# Patient Record
Sex: MALE | Race: WHITE | NOT HISPANIC OR LATINO | Employment: OTHER | URBAN - METROPOLITAN AREA
[De-identification: names, ages, dates, MRNs, and addresses within clinical notes are randomized per-mention and may not be internally consistent; named-entity substitution may affect disease eponyms.]

---

## 2018-03-19 ENCOUNTER — HOSPITAL ENCOUNTER (OUTPATIENT)
Dept: RHEUMATOLOGY | Facility: CLINIC | Age: 68
Discharge: HOME | End: 2018-03-19
Attending: INTERNAL MEDICINE | Admitting: INTERNAL MEDICINE

## 2018-03-19 ENCOUNTER — HOSPITAL ENCOUNTER (OUTPATIENT)
Dept: LAB | Facility: HOSPITAL | Age: 68
Discharge: HOME | End: 2018-03-19
Attending: INTERNAL MEDICINE | Admitting: INTERNAL MEDICINE

## 2018-03-19 LAB
ABS VARIANT LYMPHS: 0.19 10*3/UL
ALBUMIN SERPL-MCNC: 3.8 G/DL (ref 3.5–4.8)
ALBUMIN/GLOB SERPL: 1.4 {RATIO} (ref 1–1.7)
ALP SERPL-CCNC: 9 IU/L (ref 32–91)
ALT SERPL-CCNC: 44 IU/L (ref 17–63)
ANION GAP SERPL CALC-SCNC: 15.6 MMOL/L (ref 10–20)
AST SERPL-CCNC: 25 IU/L (ref 15–41)
BILIRUB SERPL-MCNC: 1.3 MG/DL (ref 0.3–1.2)
BILIRUB UR QL STRIP: NEGATIVE MG/DL
BUN SERPL-MCNC: 17 MG/DL (ref 8–20)
BUN/CREAT SERPL: 15.5 (ref 6.2–20.3)
CALCIUM SERPL-MCNC: 9.8 MG/DL (ref 8.9–10.3)
CASTS URNS QL MICRO: NORMAL /[LPF]
CHLORIDE SERPL-SCNC: 100 MMOL/L (ref 101–111)
COLOR UR: YELLOW
CONV ABS BANDS: 0.2 10*3/UL
CONV BACTERIA IN URINE MICRO: NEGATIVE
CONV CLARITY OF URINE: CLEAR
CONV CO2: 26 MMOL/L (ref 22–32)
CONV HYALINE CASTS IN URINE MICRO: 1 /[LPF] (ref 0–5)
CONV PROTEIN IN URINE BY AUTOMATED TEST STRIP: NEGATIVE MG/DL
CONV SMALL ROUND CELLS: NORMAL /[HPF]
CONV TOTAL PROTEIN: 6.5 G/DL (ref 6.1–7.9)
CONV UROBILINOGEN IN URINE BY AUTOMATED TEST STRIP: 1 MG/DL
CONV VARIANT LYMPHOCYTES RELATIVE PERCENT BY MANUAL COUNT: 2 % (ref 0–1)
CREAT UR-MCNC: 1.1 MG/DL (ref 0.7–1.2)
CRP SERPL-MCNC: 0.02 MG/DL (ref 0–0.7)
CULTURE INDICATED?: NORMAL
DIFFERENTIAL METHOD BLD: (no result)
ERYTHROCYTE [DISTWIDTH] IN BLOOD BY AUTOMATED COUNT: 15.3 % (ref 11.5–14.5)
ERYTHROCYTE [SEDIMENTATION RATE] IN BLOOD BY WESTERGREN METHOD: 6 MM/HR (ref 0–20)
GLOBULIN UR ELPH-MCNC: 2.7 G/DL (ref 2.5–3.8)
GLUCOSE SERPL-MCNC: 139 MG/DL (ref 65–99)
GLUCOSE UR QL: NEGATIVE MG/DL
HCT VFR BLD AUTO: 42.7 % (ref 40–54)
HGB BLD-MCNC: 14.4 G/DL (ref 14–18)
HGB UR QL STRIP: NEGATIVE
KETONES UR QL STRIP: NEGATIVE MG/DL
LEUKOCYTE ESTERASE UR QL STRIP: NEGATIVE
LYMPHOCYTES # BLD AUTO: 1.1 10*3/UL (ref 0.8–4.8)
LYMPHOCYTES NFR BLD AUTO: 12 % (ref 18–42)
MCH RBC QN AUTO: 30 PG (ref 26–32)
MCHC RBC AUTO-ENTMCNC: 33.7 G/DL (ref 32–36)
MCV RBC AUTO: 89 FL (ref 80–94)
MONOCYTES # BLD AUTO: 0.8 10*3/UL (ref 0.1–1.3)
MONOCYTES NFR BLD AUTO: 8 % (ref 2–11)
NEUTROPHILS # BLD AUTO: 7.2 10*3/UL (ref 2.3–8.6)
NEUTROPHILS NFR BLD AUTO: 76 % (ref 50–75)
NEUTS BAND NFR BLD MANUAL: 2 % (ref 0–5)
NITRITE UR QL STRIP: NEGATIVE
PH UR STRIP.AUTO: 5.5 [PH] (ref 4.5–8)
PLATELET # BLD AUTO: 189 10*3/UL (ref 150–450)
PMV BLD AUTO: 7.5 FL (ref 7.4–10.4)
POTASSIUM SERPL-SCNC: 4.6 MMOL/L (ref 3.6–5.1)
RBC # BLD AUTO: 4.79 10*6/UL (ref 4.6–6)
RBC #/AREA URNS HPF: 0 /[HPF] (ref 0–3)
SODIUM SERPL-SCNC: 137 MMOL/L (ref 136–144)
SP GR UR: 1.02 (ref 1–1.03)
SPERM URNS QL MICRO: NORMAL /[HPF]
SQUAMOUS SPT QL MICRO: 0 /[HPF] (ref 0–5)
UNIDENT CRYS URNS QL MICRO: NORMAL /[HPF]
URATE SERPL-MCNC: 6.3 MG/DL (ref 4.8–8.7)
WBC # BLD AUTO: 9.5 10*3/UL (ref 4.5–11.5)
WBC #/AREA URNS HPF: 0 /[HPF] (ref 0–5)
YEAST SPEC QL WET PREP: NORMAL /[HPF]

## 2018-03-20 LAB — CCP IGG ANTIBODIES: <0.4 U/ML

## 2018-03-22 LAB
ANA SER QL IA: NORMAL

## 2018-12-05 ENCOUNTER — HOSPITAL ENCOUNTER (OUTPATIENT)
Dept: LAB | Facility: HOSPITAL | Age: 68
Discharge: HOME | End: 2018-12-05
Attending: INTERNAL MEDICINE | Admitting: INTERNAL MEDICINE

## 2018-12-05 ENCOUNTER — HOSPITAL ENCOUNTER (OUTPATIENT)
Dept: RHEUMATOLOGY | Facility: CLINIC | Age: 68
Discharge: HOME | End: 2018-12-05
Attending: INTERNAL MEDICINE | Admitting: INTERNAL MEDICINE

## 2018-12-05 LAB
ALBUMIN SERPL-MCNC: 2.7 G/DL (ref 3.5–4.8)
ALPHA1 GLOB FLD ELPH-MCNC: 0.4 GM/DL (ref 0.1–0.4)
ALPHA2 GLOB SERPL ELPH-MCNC: 1.2 GM/DL (ref 0.5–1)
B-GLOBULIN SERPL ELPH-MCNC: 1 GM/DL (ref 0.7–1.4)
CONV TOTAL PROTEIN: 6 G/DL (ref 6.1–7.9)
CRP SERPL-MCNC: 6.22 MG/DL (ref 0–0.7)
ERYTHROCYTE [SEDIMENTATION RATE] IN BLOOD BY WESTERGREN METHOD: 99 MM/HR (ref 0–20)
GAMMA GLOB SERPL ELPH-MCNC: 0.7 GM/DL (ref 0.6–1.6)
INSULIN SERPL-ACNC: ABNORMAL U[IU]/ML
URATE SERPL-MCNC: 4.3 MG/DL (ref 4.8–8.7)

## 2018-12-06 LAB — 25(OH)D3 SERPL-MCNC: 26 NG/ML (ref 30–100)

## 2019-02-28 ENCOUNTER — HOSPITAL ENCOUNTER (OUTPATIENT)
Dept: ONCOLOGY | Facility: HOSPITAL | Age: 69
Discharge: HOME | End: 2019-02-28

## 2019-02-28 ENCOUNTER — HOSPITAL ENCOUNTER (OUTPATIENT)
Dept: ONCOLOGY | Facility: HOSPITAL | Age: 69
Discharge: HOME | End: 2019-02-28
Attending: INTERNAL MEDICINE | Admitting: INTERNAL MEDICINE

## 2019-02-28 ENCOUNTER — HOSPITAL ENCOUNTER (OUTPATIENT)
Dept: ONCOLOGY | Facility: CLINIC | Age: 69
Setting detail: INFUSION SERIES
Discharge: HOME | End: 2019-02-28
Attending: INTERNAL MEDICINE | Admitting: INTERNAL MEDICINE

## 2019-04-01 ENCOUNTER — CLINICAL SUPPORT (OUTPATIENT)
Dept: ONCOLOGY | Facility: HOSPITAL | Age: 69
End: 2019-04-01

## 2019-04-01 ENCOUNTER — HOSPITAL ENCOUNTER (OUTPATIENT)
Dept: ONCOLOGY | Facility: CLINIC | Age: 69
Setting detail: INFUSION SERIES
Discharge: HOME | End: 2019-04-01
Attending: INTERNAL MEDICINE | Admitting: INTERNAL MEDICINE

## 2019-04-01 ENCOUNTER — HOSPITAL ENCOUNTER (OUTPATIENT)
Dept: ONCOLOGY | Facility: HOSPITAL | Age: 69
Discharge: HOME | End: 2019-04-01
Attending: INTERNAL MEDICINE | Admitting: INTERNAL MEDICINE

## 2019-04-01 LAB
IGA1 MFR SER: 263 MG/DL (ref 50–400)
IGG1 SER-MCNC: 621 MG/DL (ref 600–1500)
IGM SERPL-MCNC: 120 MG/DL (ref 40–300)

## 2019-04-01 NOTE — PROGRESS NOTES
PATIENTS ONCOLOGY RECORD LOCATED IN [facility]      Subjective     Name:  JOJO RILEY     Date:  2019  [facility] [address] [facility] [place] [address]  Home: [unfilled]  :  1950 AGE:  69 y.o.        RECORDS OBTAINED:  Patients Oncology Record is located in [facility]

## 2019-04-03 LAB
INTERPRETATION UR IFE-IMP: NORMAL
PROT PATTERN SERPL IFE-IMP: NORMAL

## 2019-04-18 ENCOUNTER — CLINICAL SUPPORT (OUTPATIENT)
Dept: ONCOLOGY | Facility: HOSPITAL | Age: 69
End: 2019-04-18

## 2019-04-18 ENCOUNTER — HOSPITAL ENCOUNTER (OUTPATIENT)
Dept: ONCOLOGY | Facility: CLINIC | Age: 69
Setting detail: INFUSION SERIES
Discharge: HOME | End: 2019-04-18
Attending: INTERNAL MEDICINE | Admitting: INTERNAL MEDICINE

## 2019-04-18 NOTE — PROGRESS NOTES
PATIENTS ONCOLOGY RECORD LOCATED IN [place]      Subjective     Name:  JOJO RILEY     Date:  2019  [facility] [address] [facility] [place] [address]  Home: [unfilled]  :  1950 AGE:  69 y.o.        RECORDS OBTAINED:  Patients Oncology Record is located in [facility]

## 2019-04-19 ENCOUNTER — HOSPITAL ENCOUNTER (OUTPATIENT)
Dept: LAB | Facility: HOSPITAL | Age: 69
Discharge: HOME | End: 2019-04-19
Attending: INTERNAL MEDICINE | Admitting: INTERNAL MEDICINE

## 2019-04-19 LAB
ALBUMIN SERPL-MCNC: 3.8 G/DL (ref 3.5–4.8)
ALBUMIN/GLOB SERPL: 1.5 {RATIO} (ref 1–1.7)
ALP SERPL-CCNC: 16 IU/L (ref 32–91)
ALT SERPL-CCNC: 27 IU/L (ref 17–63)
ANION GAP SERPL CALC-SCNC: 15.2 MMOL/L (ref 10–20)
AST SERPL-CCNC: 20 IU/L (ref 15–41)
BILIRUB SERPL-MCNC: 1 MG/DL (ref 0.3–1.2)
BUN SERPL-MCNC: 8 MG/DL (ref 8–20)
BUN/CREAT SERPL: 6.7 (ref 6.2–20.3)
CALCIUM SERPL-MCNC: 9.8 MG/DL (ref 8.9–10.3)
CHLORIDE SERPL-SCNC: 104 MMOL/L (ref 101–111)
CONV CO2: 25 MMOL/L (ref 22–32)
CONV TOTAL PROTEIN: 6.4 G/DL (ref 6.1–7.9)
CREAT UR-MCNC: 1.2 MG/DL (ref 0.7–1.2)
CRP SERPL-MCNC: 0.05 MG/DL (ref 0–0.7)
ERYTHROCYTE [DISTWIDTH] IN BLOOD BY AUTOMATED COUNT: 22.2 % (ref 11.5–14.5)
ERYTHROCYTE [SEDIMENTATION RATE] IN BLOOD BY WESTERGREN METHOD: 14 MM/HR (ref 0–20)
GLOBULIN UR ELPH-MCNC: 2.6 G/DL (ref 2.5–3.8)
GLUCOSE SERPL-MCNC: 128 MG/DL (ref 65–99)
HCT VFR BLD AUTO: 38.3 % (ref 40–54)
HGB BLD-MCNC: 12.4 G/DL (ref 14–18)
MCH RBC QN AUTO: 29.8 PG (ref 26–32)
MCHC RBC AUTO-ENTMCNC: 32.4 G/DL (ref 32–36)
MCV RBC AUTO: 92.2 FL (ref 80–94)
PLATELET # BLD AUTO: 304 10*3/UL (ref 150–450)
PMV BLD AUTO: 7.6 FL (ref 7.4–10.4)
POTASSIUM SERPL-SCNC: 4.2 MMOL/L (ref 3.6–5.1)
RBC # BLD AUTO: 4.15 10*6/UL (ref 4.6–6)
SODIUM SERPL-SCNC: 140 MMOL/L (ref 136–144)
WBC # BLD AUTO: 7.5 10*3/UL (ref 4.5–11.5)

## 2019-04-22 LAB — CCP IGG ANTIBODIES: <0.4 U/ML

## 2019-05-02 ENCOUNTER — HOSPITAL ENCOUNTER (OUTPATIENT)
Dept: ONCOLOGY | Facility: HOSPITAL | Age: 69
Discharge: HOME | End: 2019-05-02
Attending: INTERNAL MEDICINE | Admitting: INTERNAL MEDICINE

## 2019-05-02 ENCOUNTER — HOSPITAL ENCOUNTER (OUTPATIENT)
Dept: ONCOLOGY | Facility: CLINIC | Age: 69
Setting detail: INFUSION SERIES
Discharge: HOME | End: 2019-05-02
Attending: INTERNAL MEDICINE | Admitting: INTERNAL MEDICINE

## 2019-05-02 ENCOUNTER — CLINICAL SUPPORT (OUTPATIENT)
Dept: ONCOLOGY | Facility: HOSPITAL | Age: 69
End: 2019-05-02

## 2019-05-02 NOTE — PROGRESS NOTES
PATIENTS ONCOLOGY RECORD LOCATED IN [employer]      Subjective     Name:  JOJO RILEY     Date:  2019  [facility] [address] [facility] [place] [address]  Home: [unfilled]  :  1950 AGE:  69 y.o.        RECORDS OBTAINED:  Patients Oncology Record is located in [place]

## 2019-06-28 ENCOUNTER — OFFICE VISIT (OUTPATIENT)
Dept: RHEUMATOLOGY | Facility: CLINIC | Age: 69
End: 2019-06-28

## 2019-06-28 ENCOUNTER — LAB (OUTPATIENT)
Dept: LAB | Facility: HOSPITAL | Age: 69
End: 2019-06-28

## 2019-06-28 VITALS
WEIGHT: 190 LBS | HEART RATE: 59 BPM | BODY MASS INDEX: 26.6 KG/M2 | HEIGHT: 71 IN | SYSTOLIC BLOOD PRESSURE: 127 MMHG | DIASTOLIC BLOOD PRESSURE: 72 MMHG

## 2019-06-28 DIAGNOSIS — M19.90 INFLAMMATORY ARTHRITIS: ICD-10-CM

## 2019-06-28 DIAGNOSIS — M35.3 POLYMYALGIA RHEUMATICA (HCC): ICD-10-CM

## 2019-06-28 DIAGNOSIS — D64.9 ANEMIA, UNSPECIFIED TYPE: ICD-10-CM

## 2019-06-28 DIAGNOSIS — G56.01 CARPAL TUNNEL SYNDROME OF RIGHT WRIST: Primary | ICD-10-CM

## 2019-06-28 DIAGNOSIS — M35.00 SJOGREN'S SYNDROME WITHOUT EXTRAGLANDULAR INVOLVEMENT (HCC): ICD-10-CM

## 2019-06-28 DIAGNOSIS — M85.80 OSTEOPENIA WITH HIGH RISK OF FRACTURE: ICD-10-CM

## 2019-06-28 DIAGNOSIS — M35.3 POLYMYALGIA RHEUMATICA (HCC): Primary | ICD-10-CM

## 2019-06-28 LAB
ALBUMIN SERPL-MCNC: 3.6 G/DL (ref 3.5–4.8)
ALBUMIN/GLOB SERPL: 1.3 G/DL (ref 1–1.7)
ALP SERPL-CCNC: 14 U/L (ref 32–91)
ALT SERPL W P-5'-P-CCNC: 25 U/L (ref 17–63)
ANION GAP SERPL CALCULATED.3IONS-SCNC: 12.6 MMOL/L (ref 10–20)
ANISOCYTOSIS BLD QL: ABNORMAL
AST SERPL-CCNC: 18 U/L (ref 15–41)
BACTERIA UR QL AUTO: ABNORMAL /HPF
BILIRUB SERPL-MCNC: 0.9 MG/DL (ref 0.3–1.2)
BILIRUB UR QL STRIP: NEGATIVE
BUN BLD-MCNC: 10 MG/DL (ref 8–20)
BUN/CREAT SERPL: 9.1 (ref 6.2–20.3)
CALCIUM SPEC-SCNC: 9.3 MG/DL (ref 8.9–10.3)
CHLORIDE SERPL-SCNC: 106 MMOL/L (ref 101–111)
CLARITY UR: CLEAR
CO2 SERPL-SCNC: 23 MMOL/L (ref 22–32)
COLOR UR: YELLOW
CREAT BLD-MCNC: 1.1 MG/DL (ref 0.7–1.2)
CRP SERPL-MCNC: 0.02 MG/DL (ref 0–0.7)
DEPRECATED RDW RBC AUTO: 58.6 FL (ref 37–54)
EOSINOPHIL # BLD MANUAL: 0.24 10*3/MM3 (ref 0–0.4)
EOSINOPHIL NFR BLD MANUAL: 3 % (ref 0.3–6.2)
ERYTHROCYTE [DISTWIDTH] IN BLOOD BY AUTOMATED COUNT: 18.2 % (ref 12.3–15.4)
ERYTHROCYTE [SEDIMENTATION RATE] IN BLOOD: 12 MM/HR (ref 0–20)
GFR SERPL CREATININE-BSD FRML MDRD: 67 ML/MIN/1.73
GLOBULIN UR ELPH-MCNC: 2.7 GM/DL (ref 2.5–3.8)
GLUCOSE BLD-MCNC: 130 MG/DL (ref 65–99)
GLUCOSE UR STRIP-MCNC: NEGATIVE MG/DL
HCT VFR BLD AUTO: 39.1 % (ref 37.5–51)
HGB BLD-MCNC: 12.6 G/DL (ref 13–17.7)
HGB UR QL STRIP.AUTO: NEGATIVE
HYALINE CASTS UR QL AUTO: ABNORMAL /LPF
KETONES UR QL STRIP: NEGATIVE
LEUKOCYTE ESTERASE UR QL STRIP.AUTO: NEGATIVE
LYMPHOCYTES # BLD MANUAL: 1.42 10*3/MM3 (ref 0.7–3.1)
LYMPHOCYTES NFR BLD MANUAL: 18 % (ref 19.6–45.3)
LYMPHOCYTES NFR BLD MANUAL: 6 % (ref 5–12)
MCH RBC QN AUTO: 29.7 PG (ref 26.6–33)
MCHC RBC AUTO-ENTMCNC: 32.3 G/DL (ref 31.5–35.7)
MCV RBC AUTO: 91.9 FL (ref 79–97)
MONOCYTES # BLD AUTO: 0.47 10*3/MM3 (ref 0.1–0.9)
NEUTROPHILS # BLD AUTO: 5.77 10*3/MM3 (ref 1.7–7)
NEUTROPHILS NFR BLD MANUAL: 71 % (ref 42.7–76)
NEUTS BAND NFR BLD MANUAL: 2 % (ref 0–5)
NITRITE UR QL STRIP: NEGATIVE
PH UR STRIP.AUTO: 6 [PH] (ref 5–8)
PLAT MORPH BLD: NORMAL
PLATELET # BLD AUTO: 256 10*3/MM3 (ref 140–450)
PMV BLD AUTO: 7.6 FL (ref 6–12)
POTASSIUM BLD-SCNC: 3.6 MMOL/L (ref 3.6–5.1)
PROT SERPL-MCNC: 6.3 G/DL (ref 6.1–7.9)
PROT UR QL STRIP: ABNORMAL
RBC # BLD AUTO: 4.26 10*6/MM3 (ref 4.14–5.8)
RBC # UR: ABNORMAL /HPF
REF LAB TEST METHOD: ABNORMAL
SODIUM BLD-SCNC: 138 MMOL/L (ref 136–144)
SP GR UR STRIP: 1.02 (ref 1–1.03)
SQUAMOUS #/AREA URNS HPF: ABNORMAL /HPF
UROBILINOGEN UR QL STRIP: ABNORMAL
WBC MORPH BLD: NORMAL
WBC NRBC COR # BLD: 7.9 10*3/MM3 (ref 3.4–10.8)
WBC UR QL AUTO: ABNORMAL /HPF

## 2019-06-28 PROCEDURE — 81001 URINALYSIS AUTO W/SCOPE: CPT | Performed by: INTERNAL MEDICINE

## 2019-06-28 PROCEDURE — 99214 OFFICE O/P EST MOD 30 MIN: CPT | Performed by: INTERNAL MEDICINE

## 2019-06-28 PROCEDURE — 85652 RBC SED RATE AUTOMATED: CPT | Performed by: INTERNAL MEDICINE

## 2019-06-28 PROCEDURE — 80053 COMPREHEN METABOLIC PANEL: CPT | Performed by: INTERNAL MEDICINE

## 2019-06-28 PROCEDURE — 85007 BL SMEAR W/DIFF WBC COUNT: CPT | Performed by: INTERNAL MEDICINE

## 2019-06-28 PROCEDURE — 86140 C-REACTIVE PROTEIN: CPT | Performed by: INTERNAL MEDICINE

## 2019-06-28 PROCEDURE — 36415 COLL VENOUS BLD VENIPUNCTURE: CPT

## 2019-06-28 PROCEDURE — 85027 COMPLETE CBC AUTOMATED: CPT | Performed by: INTERNAL MEDICINE

## 2019-06-28 RX ORDER — CARVEDILOL 12.5 MG/1
12.5 TABLET ORAL
COMMUNITY
Start: 2019-02-05

## 2019-06-28 RX ORDER — FLUTICASONE FUROATE 100 UG/1
POWDER RESPIRATORY (INHALATION)
Refills: 3 | COMMUNITY
Start: 2019-06-18

## 2019-06-28 RX ORDER — MULTIVIT WITH MINERALS/LUTEIN
500 TABLET ORAL DAILY
COMMUNITY
End: 2019-09-11 | Stop reason: SDUPTHER

## 2019-06-28 RX ORDER — RANOLAZINE 1000 MG/1
1000 TABLET, EXTENDED RELEASE ORAL
COMMUNITY

## 2019-06-28 RX ORDER — IRON POLYSACCHARIDE COMPLEX 150 MG
150 CAPSULE ORAL 2 TIMES DAILY
Refills: 6 | COMMUNITY
Start: 2019-06-04 | End: 2019-07-08 | Stop reason: SDUPTHER

## 2019-06-28 RX ORDER — MONTELUKAST SODIUM 10 MG/1
10 TABLET ORAL DAILY
Refills: 3 | COMMUNITY
Start: 2019-04-27

## 2019-06-28 RX ORDER — CALCIUM CARB/VIT D3/MINERALS 600 MG-200
1000 TABLET,CHEWABLE ORAL DAILY
Refills: 3 | COMMUNITY
Start: 2019-06-02

## 2019-06-28 RX ORDER — HYDROXYCHLOROQUINE SULFATE 200 MG/1
20 TABLET, FILM COATED ORAL
COMMUNITY
Start: 2017-06-09 | End: 2019-06-28

## 2019-06-28 RX ORDER — MELOXICAM 15 MG/1
15 TABLET ORAL DAILY
Qty: 90 TABLET | Refills: 1 | Status: SHIPPED | OUTPATIENT
Start: 2019-06-28

## 2019-06-28 RX ORDER — PREDNISONE 1 MG/1
TABLET ORAL
Refills: 2 | COMMUNITY
Start: 2019-05-04 | End: 2019-09-11

## 2019-06-28 RX ORDER — CLOPIDOGREL BISULFATE 75 MG/1
75 TABLET ORAL DAILY
Refills: 3 | COMMUNITY
Start: 2019-04-27

## 2019-06-28 RX ORDER — PYRIDOXINE HCL (VITAMIN B6) 100 MG
TABLET ORAL
COMMUNITY
Start: 2019-02-05

## 2019-06-28 NOTE — PROGRESS NOTES
Subjective   Chief Complaint   Patient presents with   • Follow-up        Eliceo Jacques is a 69 y.o. male.     History of Present Illness    He is a 69 years old well-built very pleasant white male with a history of the inflammatory arthritis and also polymyalgia rheumatica on diabetes and carpal tunnel syndrome and osteopenia with high risk for fractures that currently take methotrexate 4 tablets a week and prednisone 5 mg a day and Plaquenil 2 a day.  Overall he improved he does not have any significant swelling or pain in his joints he managed to do his daily activity without any significant problem.  No temporal headaches jaw pains or blurring of the visions.  He has not noticed any significant changes as far as pains are concerned since he decreased the prednisone from 7-1/2-5.  Over the give him the instruction to gradually taper himself off the prednisone completely especially since have osteopenia with high risk for fractures and also his lab blood tests that was done on April 19 sed rate and CRP were normal.  Therefore I suggested that stop the Plaquenil I would continue tapering him off the prednisone and continue with the methotrexate 4 tablets a week.  No feature of the lupus no fever no chills no significant muscle weakness.  He has numbness and tingling in his hand periodically.  Overall he has significantly improved according to patient's and his wife.  He also taking Fosamax once a week and would continue that since bone density test shows osteopenia with high risk for fractures.    The following portions of the patient's history were reviewed and updated as appropriate: allergies, current medications, past family history, past medical history, past social history, past surgical history and problem list.    Past Medical History:   Diagnosis Date   • Arthritis       Past Surgical History:   Procedure Laterality Date   • CARPAL TUNNEL RELEASE Left 03/01/2017   • CHOLECYSTECTOMY     • CORONARY  ANGIOPLASTY WITH STENT PLACEMENT     • OTHER SURGICAL HISTORY      heart bypass     Family History   Family history unknown: Yes      Social History     Socioeconomic History   • Marital status:      Spouse name: Not on file   • Number of children: Not on file   • Years of education: Not on file   • Highest education level: Not on file   Tobacco Use   • Smoking status: Never Smoker   • Smokeless tobacco: Never Used   Substance and Sexual Activity   • Alcohol use: No     Frequency: Never     No Known Allergies     Current Outpatient Medications:   •  ANORO ELLIPTA 62.5-25 MCG/INH aerosol powder  inhaler, , Disp: , Rfl:   •  ARNUITY ELLIPTA 100 MCG/ACT aerosol powder , USE 1 PUFF DAILY **REPLACES QVAR DUE TO INSURANCE INTERFERENCE**, Disp: , Rfl: 3  •  aspirin 81 MG tablet, Take 81 mg by mouth Daily., Disp: , Rfl:   •  B Complex-Biotin-FA (B COMPLETE) tablet, B COMPLETE TABS, Disp: , Rfl:   •  beclomethasone (QVAR) 80 MCG/ACT inhaler, Inhale 1 puff., Disp: , Rfl:   •  carvedilol (COREG) 12.5 MG tablet, Take 12.5 mg by mouth., Disp: , Rfl:   •  clopidogrel (PLAVIX) 75 MG tablet, Take 75 mg by mouth Daily., Disp: , Rfl: 3  •  [employer]S B-12 500 MCG tablet, Take 1,000 mcg by mouth Daily., Disp: , Rfl: 3  •  FERREX 150 150 MG capsule, Take 150 mg by mouth 2 (Two) Times a Day., Disp: , Rfl: 6  •  methotrexate 2.5 MG tablet, Take 4 tablets by mouth 1 (One) Time Per Week., Disp: 52 tablet, Rfl: 0  •  montelukast (SINGULAIR) 10 MG tablet, Take 10 mg by mouth Daily., Disp: , Rfl: 3  •  predniSONE (DELTASONE) 5 MG tablet, TAKE 1 1/2 TABLETS BY MOUTH ONCE DAILY, Disp: , Rfl: 2  •  ranolazine (RANEXA) 1000 MG 12 hr tablet, Take 1,000 mg by mouth., Disp: , Rfl:   •  vitamin C (ASCORBIC ACID) 250 MG tablet, Take 500 mg by mouth Daily., Disp: , Rfl:      Review of System    Appetite is good he does not have any significant GI  or respiratory problems no significant ears nose throat problem.  Has prostate problem and is seeing the  urologist and have done a PSA and have appointment to see them again.  He also occasionally complains of chest discomfort but again seeing the PCP and been followed by them regularly.  Rest of the review of the systems unremarkable.    Objective   Physical Exam    He is alert orientated cooperative he comes to the office accompanied with his wife no acute distress his gait and postures are fairly normal.  HEENT unremarkable.  Chest is clear to auscultation and percussion.  Cardiovascular S1 and S2 there is no murmur.  Abdomen soft there is no organomegaly bowel sounds are present.  Neurologic exam deep tendon reflexes are symmetrical normal upper and lower extremities.  Extremity there is no pedal edema no varicose of the vein no rash and negative Homans sign.  Joint exam: He has fairly normal range of motion of his joint with a slight decrease of the hip and the spine.  Joint tenderness is very minimal tenderness over the shoulders and over the couple of the PIP and DIP joints mild osteoarthritic changes of his hand couple of Heberden and Jimbo nodes.  There is no significant swelling in any joints.  Muscle exam  are slightly decreased there is no significant muscle weakness or muscle atrophy.  His skin there is no significant rash or subcutaneous note psychological exams are normal.    Assessment/Plan   Problem List Items Addressed This Visit        Nervous and Auditory    Carpal tunnel syndrome of right wrist - Primary    Polymyalgia rheumatica (CMS/HCC)       Musculoskeletal and Integument    Inflammatory arthritis    Relevant Medications    methotrexate 2.5 MG tablet    Other Relevant Orders    Comprehensive Metabolic Panel    CBC With Manual Differential    Sedimentation Rate    C-reactive Protein    Urinalysis With Culture If Indicated - Urine, Clean Catch    Osteopenia with high risk of fracture       Immune and Lymphatic    Sjogren's syndrome (CMS/HCC)    Relevant Medications    methotrexate 2.5 MG  tablet    Other Relevant Orders    Comprehensive Metabolic Panel    CBC With Manual Differential    Sedimentation Rate    C-reactive Protein    Urinalysis With Culture If Indicated - Urine, Clean Catch       Hematopoietic and Hemostatic    Anemia    Relevant Medications    CVS B-12 500 MCG tablet    FERREX 150 150 MG capsule          Patient advised continue with methotrexate and stop the Plaquenil as a few plaque on the left which I suggested take 1 a day and also I suggested that decrease the prednisone from 5 mg to 5 alternate with 2-1/2 for a month and then switch to 2-1/2 mg once a day and then 1/2 mg every other day.  Continue with taking Fosamax with talk about good nutrition and exercise joint protection fall preventions especially since he has osteopenia with high risk for fractures need to be extra caution not have any fall.  He do the blood work today and I will see him again in 3 months or sooner if needed.  There are no Patient Instructions on file for this visit.

## 2019-07-05 PROBLEM — D64.9 ANEMIA: Chronic | Status: ACTIVE | Noted: 2019-02-05

## 2019-07-05 PROBLEM — M06.9 RHEUMATOID ARTHRITIS INVOLVING MULTIPLE SITES (HCC): Status: ACTIVE | Noted: 2019-07-05

## 2019-07-05 NOTE — PROGRESS NOTES
Hematology/Oncology Outpatient Follow Up    Patient name:Eliceo Jacques  :1950  MRN: [id number]  Primary Care Physician: Hung Hwang MD  Referring Physician: Hung Hwang MD    Chief Complaint   Patient presents with   • Follow-up     Anemia- ACD, Rheumatoid arthritis        History of Present Illness:   1.   Anemia diagnosed 2018.   · 19 - This patient was hospitalized at [facility] between 19 and 19 after transfer from   [facility] where he presented with chest pain and shortness of air that had been present for several   months and had worsened the day prior to admission. He had injections for vocal cord paralysis on   19 and reported chronic cough that became productive post injections. He was not having any   fevers or chills. At [facility] hemoglobin was 9.9 and on admission to [facility] WBC 17.2, hemoglobin 7.9, MCV   78, platelet count 278,000. He underwent left heart catheterization with medical management   planned. Review of his prior records revealed anemia dating back to 2018, worsening over   time. He reported blood loss after right arterial bypass in 2018 when iron studies had   revealed low iron of 23, TIBC normal at 304, iron saturation low at 7 and ferritin normal at   67.4. Stool Hemoccult was negative. He was started on Methotrexate by Dr. Finnegan for rheumatoid   arthritis five weeks prior to admission. He reported a 30 pounds weight loss within a year and   reported history of hiatal hernia with GERD. He denied rectal bleeding, black stool or melena. He   had colonoscopy a little over five years ago by Dr. Alberts revealing polyps and was due for a   repeat in 2018 but did not schedule due to other illnesses. His last EGD was a long time   ago. Hematology consultation was obtained and workup revealed UA negative for blood, RDW 24.5,   creatinine 0.9 (0.7-1.2), transaminases normal, iron 17 (), TIBC 285 (261-452), iron sat 6   (20-55), ferritin 82.2  (17.9-464), folate 10.4 (2.8-20), Vitamin B12 of 294 (239-931),   haptoglobin 496 (), copper 1101 (510-1610), MMA 0.15 (0-0.4), stool Hemoccult negative,   SPEP with slight hypogammaglobulinemia with no monoclonal protein identified. Chest x-ray had no   acute cardiopulmonary abnormality. He was transfused 1 unit of packed red blood cells for a   hemoglobin of 7.9. Retic count was normal. Patient was started on oral Vitamin B12 replacement   and weekly Methotrexate was held.       · 2/28/19 - [facility]   hospitalization. Hemoglobin 11.1, MCV 82.5, RDW 20.8. Patient continued on Ferrex one pill daily   while scheduling colonoscopy. Vitamin B12 pills discontinued. Ferritin 85 (). Hemoglobin   electrophoresis with normal pattern.          · 4/1/19 - Discussed process of anemia and options of checking bone marrow biopsy. Patient wants   to proceed. IgA 263 (), IgG 621 (600-1500), IgM 120 (). Serum immunofixation with no   monoclonal gammopathy identified and urine immunofixation with no monoclonal gammopathy   identified.      -4/25/19-Left femoral-popliteal bypass performed by Dr. Mac for LLE occlusion.   · 4/18/19- Bone marrow aspiration and biopsy with normocellular bone marrow with maturing   trilineage hematopoiesis. There were inadequate spicules for assessment of storage iron. Flow   cytometry had no phenotypic evidence of excess blasts, myeloid dysmaturation of non-Hodgkin's   lymphoma. Immunohistochemical analysis revealed no increase in blasts. Cytogenetics revealed 46   XY normal male karyotype.   -4/28/[id number]-lquitloesguboj-PV 58%.  Chest x-ray probable mild left basilar atelectasis.  · 5/2/19 - Patient claims to be feeling lightheaded after having had vascular surgery on the left   lower extremity the previous week where he did require a unit of packed red blood cells and the   hemoglobin was still low at time of  discharge from [place]. Ferritin 67 (N).   Past Medical History:   Diagnosis Date   • Arthritis        Past Surgical History:   Procedure Laterality Date   • CARPAL TUNNEL RELEASE Left 03/01/2017   • CHOLECYSTECTOMY     • CORONARY ANGIOPLASTY WITH STENT PLACEMENT     • OTHER SURGICAL HISTORY      heart bypass         Current Outpatient Medications:   •  ANORO ELLIPTA 62.5-25 MCG/INH aerosol powder  inhaler, , Disp: , Rfl:   •  ARNUITY ELLIPTA 100 MCG/ACT aerosol powder , USE 1 PUFF DAILY **REPLACES QVAR DUE TO INSURANCE INTERFERENCE**, Disp: , Rfl: 3  •  ascorbic acid (VITAMIN C) 100 MG tablet, VITAMIN C TABS, Disp: , Rfl:   •  aspirin 81 MG tablet, Take 81 mg by mouth Daily., Disp: , Rfl:   •  B Complex-Biotin-FA (B COMPLETE) tablet, B COMPLETE TABS, Disp: , Rfl:   •  beclomethasone (QVAR) 80 MCG/ACT inhaler, Inhale 1 puff., Disp: , Rfl:   •  carvedilol (COREG) 12.5 MG tablet, Take 12.5 mg by mouth., Disp: , Rfl:   •  clopidogrel (PLAVIX) 75 MG tablet, Take 75 mg by mouth Daily., Disp: , Rfl: 3  •  FERREX 150 150 MG capsule, Take 150 mg by mouth 2 (Two) Times a Day., Disp: , Rfl: 6  •  losartan (COZAAR) 100 MG tablet, Take 100 mg by mouth Daily., Disp: , Rfl: 3  •  methotrexate 2.5 MG tablet, Take 4 tablets by mouth 1 (One) Time Per Week., Disp: 52 tablet, Rfl: 0  •  montelukast (SINGULAIR) 10 MG tablet, Take 10 mg by mouth Daily., Disp: , Rfl: 3  •  predniSONE (DELTASONE) 5 MG tablet, TAKE 1 1/2 TABLETS BY MOUTH ONCE DAILY, Disp: , Rfl: 2  •  ranolazine (RANEXA) 1000 MG 12 hr tablet, Take 1,000 mg by mouth., Disp: , Rfl:   •  vitamin C (ASCORBIC ACID) 250 MG tablet, Take 500 mg by mouth Daily., Disp: , Rfl:   •  CVS B-12 500 MCG tablet, Take 1,000 mcg by mouth Daily., Disp: , Rfl: 3  •  meloxicam (MOBIC) 15 MG tablet, Take 1 tablet by mouth Daily., Disp: 90 tablet, Rfl: 1    No Known Allergies    Family History   Family history unknown: Yes       Family history is unknown by patient.    Social History     Tobacco  "Use   • Smoking status: Never Smoker   • Smokeless tobacco: Never Used   Substance Use Topics   • Alcohol use: No     Frequency: Never   • Drug use: Not on file       I have reviewed the history of present illness, past medical history, family history, social history, lab results, all notes and other records since the patient was last seen on Visit 5/2/19.    SUBJECTIVE:  Patient states his last Dexascan was in [place] at [facility] early this year. He reports that he had a colonoscopy earlier this year at [facility].  He is taking Prednisone and Methotrexate for RA. He reports that he is taking oral Iron twice a day.  He Nuys any overt bleeding.  He is on Plavix for his peripheral vascular disease.        ROS:  Review of Systems   Constitutional: Negative for diaphoresis, fatigue, fever and unexpected weight change.   HENT: Negative for congestion and nosebleeds.    Eyes: Negative.    Respiratory: Positive for cough (occasionally productive. Clear sputum.). Negative for shortness of breath.    Cardiovascular: Negative for chest pain and leg swelling.   Gastrointestinal: Negative for abdominal pain, blood in stool, constipation, diarrhea, nausea and vomiting.        Dark stools due to oral Iron.    Endocrine: Negative for cold intolerance and heat intolerance.   Genitourinary: Negative for dysuria and hematuria.   Musculoskeletal: Negative for arthralgias and joint swelling.   Skin: Negative for rash and wound.   Allergic/Immunologic: Positive for environmental allergies.   Neurological: Negative for numbness and headaches.   Hematological: Does not bruise/bleed easily.   Psychiatric/Behavioral: Negative for confusion. The patient is not nervous/anxious.    All other systems reviewed and are negative.      Objective:    Vitals:    07/08/19 1207   BP: 124/69   Pulse: 61   Resp: 18   Temp: 97.9 °F (36.6 °C)   Weight: 85.8 kg (189 lb 3.2 oz)   Height: 180.3 cm (71\")   PainSc: 0-No pain       ECOG  (1) Restricted in physically " strenuous activity, ambulatory and able to do work of light nature    Physical Exam   Constitutional: He is oriented to person, place, and time. He appears well-developed and well-nourished.   Wife present.    HENT:   Head: Normocephalic and atraumatic.   Mouth/Throat: Oropharynx is clear and moist.   Eyes: Conjunctivae, EOM and lids are normal. Pupils are equal, round, and reactive to light.   Neck: Normal range of motion. Neck supple. No thyromegaly present.   Cardiovascular: Normal rate, regular rhythm and normal heart sounds.   No murmur heard.  Pulmonary/Chest: Effort normal and breath sounds normal. No respiratory distress.   Abdominal: Soft. Normal appearance and bowel sounds are normal. He exhibits no distension.   Genitourinary:   Genitourinary Comments: Deferred.   Musculoskeletal: Normal range of motion. He exhibits no edema.   Lymphadenopathy:     He has no cervical adenopathy.     He has no axillary adenopathy.        Right: No supraclavicular adenopathy present.        Left: No supraclavicular adenopathy present.   Neurological: He is alert and oriented to person, place, and time.   Skin: Skin is warm and dry. Capillary refill takes less than 2 seconds. No bruising, no petechiae and no rash noted.   8cm scar on left medial lower leg. Large healed incision left medial thigh.  Right scar on right medical thigh.   Echimosis on bilateral arms.      Psychiatric: He has a normal mood and affect. His speech is normal and behavior is normal. Judgment and thought content normal. Cognition and memory are normal.   Nursing note and vitals reviewed.      RECENT LABS  WBC   Date Value Ref Range Status   06/28/2019 7.90 3.40 - 10.80 10*3/mm3 Final   04/29/2019 8.35 4.5 - 11.0 10*3/uL Final     RBC   Date Value Ref Range Status   06/28/2019 4.26 4.14 - 5.80 10*6/mm3 Final   04/29/2019 2.46 (L) 4.5 - 5.9 10*6/uL Final   04/28/2019 2.65 (L) 4.5 - 5.9 10*6/uL Final     Hemoglobin   Date Value Ref Range Status    06/28/2019 12.6 (L) 13.0 - 17.7 g/dL Final   04/29/2019 7.3 (L) 13.5 - 17.5 g/dL Final     Hematocrit   Date Value Ref Range Status   06/28/2019 39.1 37.5 - 51.0 % Final   04/29/2019 22.6 (L) 41.0 - 53.0 % Final     MCV   Date Value Ref Range Status   06/28/2019 91.9 79.0 - 97.0 fL Final   04/29/2019 91.9 80.0 - 100.0 fL Final     MCH   Date Value Ref Range Status   06/28/2019 29.7 26.6 - 33.0 pg Final   04/29/2019 29.7 26.0 - 34.0 pg Final     MCHC   Date Value Ref Range Status   06/28/2019 32.3 31.5 - 35.7 g/dL Final   04/29/2019 32.3 31.0 - 37.0 g/dL Final     RDW   Date Value Ref Range Status   06/28/2019 18.2 (H) 12.3 - 15.4 % Final   04/29/2019 19.7 (H) 12.0 - 16.8 % Final     RDW-SD   Date Value Ref Range Status   06/28/2019 58.6 (H) 37.0 - 54.0 fl Final     MPV   Date Value Ref Range Status   06/28/2019 7.6 6.0 - 12.0 fL Final   04/29/2019 9.4 6.7 - 10.8 fL Final     Platelets   Date Value Ref Range Status   06/28/2019 256 140 - 450 10*3/mm3 Final   04/29/2019 182 140 - 440 10*3/uL Final     Neutrophil Rel %   Date Value Ref Range Status   04/29/2019 62.0 45 - 80 % Final     Lymphocyte Rel %   Date Value Ref Range Status   04/29/2019 26.5 15 - 50 % Final     Monocyte Rel %   Date Value Ref Range Status   04/29/2019 7.8 0 - 15 % Final     Eosinophil %   Date Value Ref Range Status   04/29/2019 2.8 0 - 7 % Final   04/27/2019 1.0 0 - 7 % Final     Basophil Rel %   Date Value Ref Range Status   04/29/2019 0.5 0 - 2 % Final     Immature Grans %   Date Value Ref Range Status   04/29/2019 0.4 (H) 0 % Final     Neutrophils Absolute   Date Value Ref Range Status   06/28/2019 5.77 1.70 - 7.00 10*3/mm3 Final   04/29/2019 5.19 2.0 - 8.8 10*3/uL Final     Lymphocytes Absolute   Date Value Ref Range Status   04/29/2019 2.21 0.7 - 5.5 10*3/uL Final     Monocytes Absolute   Date Value Ref Range Status   04/29/2019 0.65 0.0 - 1.7 10*3/uL Final     Eosinophils Absolute   Date Value Ref Range Status   06/28/2019 0.24 0.00 -  0.40 10*3/mm3 Final   04/29/2019 0.23 0.0 - 0.8 10*3/uL Final     Basophils Absolute   Date Value Ref Range Status   04/29/2019 0.04 0.0 - 0.2 10*3/uL Final     Immature Grans, Absolute   Date Value Ref Range Status   04/29/2019 0.03 <1 10*3/uL Final     nRBC   Date Value Ref Range Status   04/29/2019 0 0 /100(WBC) Final       Lab Results   Component Value Date    GLUCOSE 130 (H) 06/28/2019    BUN 10 06/28/2019    CREATININE 1.10 06/28/2019    EGFRIFNONA 67 06/28/2019    BCR 9.1 06/28/2019    K 3.6 06/28/2019    CO2 23.0 06/28/2019    CALCIUM 9.3 06/28/2019    ALBUMIN 3.60 06/28/2019    LABIL2 1.2 04/26/2019    AST 18 06/28/2019    ALT 25 06/28/2019 6/28/19- reviewed labs formed by Dr. Finnegan.  -white blood cell count 7.9, hemoglobin 12.6, MCV 91.9, platelets 256.  CMP remarkable for glucose 130 (H).  ESR 12.  CRP 0.02 (N).    Assessment/Plan     Anemia, unspecified type  - Occult Blood X 3, Stool - Stool, Per Rectum    Rheumatoid arthritis involving multiple sites, unspecified rheumatoid factor presence (CMS/HCC)    Osteopenia with high risk of fracture    Sjogren's syndrome without extraglandular involvement (CMS/HCC)    Anemia of chronic disease  - Occult Blood X 3, Stool - Stool, Per Rectum      ASSESSMENT:    1. Anemia- ACD: No longer taking Plaquenil for his inflammatory arthritis but he continues to take methotrexate and prednisone.  Hemoglobin has improved.  Concerned that he is taking iron twice a day and his ferritin last visit was 67, he reports taking a PPI..  He denies any overt bleeding.  Stool heme ordered.  He reports he had a colonoscopy not too long ago at [facility].  Will obtain report.  Reviewed discharge summary, op note, echocardiogram and chest x-ray performed in April 2019.  2. Rheumatoid arthritis: Dr. Finnegan.  Will obtain DEXA scan performed at [facility] and.    PLAN:   1. Stool for occult blood.  2. Obtain DEXA scan and colonoscopy at [facility].  3.   Continue Ferrex.    I have reviewed labs results,  imaging, vitals, and medications with the patient today. Will follow up in 2 months with Dr. Gilmore.       Patient verbalized understanding and is in agreement of the above plan.         Much of the above report is an electronic transcription//translation of the spoken language to printed text using Dragon Software. As such, the subtleties and finesse of the spoken language may permit erroneous, or at times, nonsensical words or phrases to be inadvertently transcribed; thus changes may be made at a later date to rectify these errors.

## 2019-07-08 ENCOUNTER — APPOINTMENT (OUTPATIENT)
Dept: LAB | Facility: HOSPITAL | Age: 69
End: 2019-07-08

## 2019-07-08 ENCOUNTER — OFFICE VISIT (OUTPATIENT)
Dept: ONCOLOGY | Facility: CLINIC | Age: 69
End: 2019-07-08

## 2019-07-08 VITALS
RESPIRATION RATE: 18 BRPM | BODY MASS INDEX: 26.49 KG/M2 | TEMPERATURE: 97.9 F | DIASTOLIC BLOOD PRESSURE: 69 MMHG | WEIGHT: 189.2 LBS | HEART RATE: 61 BPM | HEIGHT: 71 IN | SYSTOLIC BLOOD PRESSURE: 124 MMHG

## 2019-07-08 DIAGNOSIS — D64.9 ANEMIA, UNSPECIFIED TYPE: Primary | ICD-10-CM

## 2019-07-08 DIAGNOSIS — M06.9 RHEUMATOID ARTHRITIS INVOLVING MULTIPLE SITES, UNSPECIFIED RHEUMATOID FACTOR PRESENCE: ICD-10-CM

## 2019-07-08 DIAGNOSIS — M85.80 OSTEOPENIA WITH HIGH RISK OF FRACTURE: ICD-10-CM

## 2019-07-08 DIAGNOSIS — D63.8 ANEMIA OF CHRONIC DISEASE: ICD-10-CM

## 2019-07-08 DIAGNOSIS — M35.00 SJOGREN'S SYNDROME WITHOUT EXTRAGLANDULAR INVOLVEMENT (HCC): ICD-10-CM

## 2019-07-08 PROCEDURE — G0463 HOSPITAL OUTPT CLINIC VISIT: HCPCS | Performed by: NURSE PRACTITIONER

## 2019-07-08 PROCEDURE — 99214 OFFICE O/P EST MOD 30 MIN: CPT | Performed by: NURSE PRACTITIONER

## 2019-07-08 RX ORDER — IRON POLYSACCHARIDE COMPLEX 150 MG
150 CAPSULE ORAL 2 TIMES DAILY
Qty: 60 CAPSULE | Refills: 6
Start: 2019-07-08

## 2019-07-08 RX ORDER — LOSARTAN POTASSIUM 100 MG/1
100 TABLET ORAL DAILY
Refills: 3 | COMMUNITY
Start: 2019-06-13

## 2019-07-22 LAB
COLLECT DATE SP2 STL: NORMAL
COLLECT DATE SP3 STL: NORMAL
COLLECT DATE STL: NORMAL
HEMOCCULT STL QL: NEGATIVE
Lab: NORMAL

## 2019-07-22 PROCEDURE — 82270 OCCULT BLOOD FECES: CPT | Performed by: NURSE PRACTITIONER

## 2019-08-27 ENCOUNTER — TELEPHONE (OUTPATIENT)
Dept: ONCOLOGY | Facility: CLINIC | Age: 69
End: 2019-08-27

## 2019-08-27 NOTE — TELEPHONE ENCOUNTER
Patient requesting stool card results.  Chart reviewed.  Informed patient they were all 3 negative.  He v/kat Bravo

## 2019-08-27 NOTE — TELEPHONE ENCOUNTER
Ms. Jacques left message she needed to schedule appt for follow up.  Returned call, patient last seen in July, schedule for 2 month follow up.  Scheduled appt for 9/13.

## 2019-09-11 ENCOUNTER — APPOINTMENT (OUTPATIENT)
Dept: LAB | Facility: HOSPITAL | Age: 69
End: 2019-09-11

## 2019-09-11 ENCOUNTER — OFFICE VISIT (OUTPATIENT)
Dept: ONCOLOGY | Facility: CLINIC | Age: 69
End: 2019-09-11

## 2019-09-11 VITALS
BODY MASS INDEX: 27.47 KG/M2 | WEIGHT: 196.2 LBS | SYSTOLIC BLOOD PRESSURE: 112 MMHG | HEART RATE: 54 BPM | HEIGHT: 71 IN | TEMPERATURE: 97.8 F | RESPIRATION RATE: 18 BRPM | DIASTOLIC BLOOD PRESSURE: 67 MMHG

## 2019-09-11 DIAGNOSIS — D63.8 ANEMIA OF CHRONIC DISEASE: Primary | ICD-10-CM

## 2019-09-11 DIAGNOSIS — M35.00 SJOGREN'S SYNDROME, WITH UNSPECIFIED ORGAN INVOLVEMENT (HCC): ICD-10-CM

## 2019-09-11 DIAGNOSIS — M05.79 RHEUMATOID ARTHRITIS INVOLVING MULTIPLE SITES WITH POSITIVE RHEUMATOID FACTOR (HCC): ICD-10-CM

## 2019-09-11 LAB
BASOPHILS # BLD AUTO: 0.07 10*3/MM3 (ref 0–0.2)
BASOPHILS NFR BLD AUTO: 1.1 % (ref 0–1.5)
DEPRECATED RDW RBC AUTO: 57.2 FL (ref 37–54)
EOSINOPHIL # BLD AUTO: 0.35 10*3/MM3 (ref 0–0.4)
EOSINOPHIL NFR BLD AUTO: 5.5 % (ref 0.3–6.2)
ERYTHROCYTE [DISTWIDTH] IN BLOOD BY AUTOMATED COUNT: 18.1 % (ref 12.3–15.4)
HCT VFR BLD AUTO: 36.9 % (ref 37.5–51)
HGB BLD-MCNC: 12.4 G/DL (ref 13–17.7)
LYMPHOCYTES # BLD AUTO: 1.95 10*3/MM3 (ref 0.7–3.1)
LYMPHOCYTES NFR BLD AUTO: 30.6 % (ref 19.6–45.3)
MCH RBC QN AUTO: 30.2 PG (ref 26.6–33)
MCHC RBC AUTO-ENTMCNC: 33.6 G/DL (ref 31.5–35.7)
MCV RBC AUTO: 90 FL (ref 79–97)
MONOCYTES # BLD AUTO: 0.43 10*3/MM3 (ref 0.1–0.9)
MONOCYTES NFR BLD AUTO: 6.7 % (ref 5–12)
NEUTROPHILS # BLD AUTO: 3.58 10*3/MM3 (ref 1.7–7)
NEUTROPHILS NFR BLD AUTO: 56.1 % (ref 42.7–76)
PLATELET # BLD AUTO: 333 10*3/MM3 (ref 140–450)
PMV BLD AUTO: 8.6 FL (ref 6–12)
RBC # BLD AUTO: 4.1 10*6/MM3 (ref 4.14–5.8)
WBC NRBC COR # BLD: 6.38 10*3/MM3 (ref 3.4–10.8)

## 2019-09-11 PROCEDURE — 36415 COLL VENOUS BLD VENIPUNCTURE: CPT | Performed by: INTERNAL MEDICINE

## 2019-09-11 PROCEDURE — 85025 COMPLETE CBC W/AUTO DIFF WBC: CPT | Performed by: INTERNAL MEDICINE

## 2019-09-11 PROCEDURE — 99213 OFFICE O/P EST LOW 20 MIN: CPT | Performed by: INTERNAL MEDICINE

## 2019-09-11 RX ORDER — ROSUVASTATIN CALCIUM 10 MG/1
10 TABLET, COATED ORAL DAILY
Refills: 3 | COMMUNITY
Start: 2019-08-04

## 2019-09-11 RX ORDER — AMLODIPINE BESYLATE 5 MG/1
5 TABLET ORAL DAILY
Refills: 3 | COMMUNITY
Start: 2019-07-26

## 2019-09-11 RX ORDER — FOLIC ACID 1 MG/1
1000 TABLET ORAL DAILY
Refills: 3 | COMMUNITY
Start: 2019-07-26

## 2019-09-11 RX ORDER — ISOSORBIDE MONONITRATE 60 MG/1
60 TABLET, EXTENDED RELEASE ORAL DAILY
Refills: 3 | COMMUNITY
Start: 2019-08-16

## 2019-09-11 NOTE — PROGRESS NOTES
Hematology/Oncology Outpatient Follow Up    Patient name:Eliceo Jacques  :1950  MRN: [id number]  Primary Care Physician: Hung Hwang MD  Referring Physician: Hung Hwang MD    Chief Complaint   Patient presents with   • Follow-up     for anemia of chronic disease and RA     History of Present Illness:   1.   Anemia diagnosed 2018-ACD secondary to rheumatoid arthritis.   • 19 - This patient was hospitalized at [facility] between 19 and 19 after transfer from [facility] where he presented with chest pain and shortness of air that had been present for several months and had worsened the day prior to admission. He had injections for vocal cord paralysis on 19 and reported chronic cough that became productive post injections. He was not having any fevers or chills. At [facility] hemoglobin was 9.9 and on admission to [facility] WBC 17.2, hemoglobin 7.9, MCV 78, platelet count 278,000. He underwent left heart catheterization with medical management planned. Review of his prior records revealed anemia dating back to 2018, worsening over time. He reported blood loss after right arterial bypass in 2018 when iron studies had revealed low iron of 23, TIBC normal at 304, iron saturation low at 7 and ferritin normal at 67.4. Stool Hemoccult was negative. He was started on Methotrexate by Dr. Finnegan for rheumatoid arthritis five weeks prior to admission. He reported a 30 pounds weight loss within a year and reported history of hiatal hernia with GERD. He denied rectal bleeding, black stool or melena. He had colonoscopy a little over five years ago by Dr. Alberts revealing polyps and was due for a repeat in 2018 but did not schedule due to other illnesses. His last EGD was a long time ago. Hematology consultation was obtained and workup revealed UA negative for blood, RDW 24.5, creatinine 0.9 (0.7-1.2), transaminases normal, iron 17 (), TIBC 285 (261-452), iron sat 6 (20-55),  ferritin 82.2 (17.9-464), folate 10.4 (2.8-20), Vitamin B12 of 294 (239-931), haptoglobin 496 (), copper 1101 (510-1610), MMA 0.15 (0-0.4), stool Hemoccult negative, SPEP with slight hypogammaglobulinemia with no monoclonal protein identified. Chest x-ray had no acute cardiopulmonary abnormality. He was transfused 1 unit of packed red blood cells for a hemoglobin of 7.9. Retic count was normal. Patient was started on oral Vitamin B12 replacement and weekly Methotrexate was held.  1/7/2019 DEXA scan showed osteopenia of femoral neck. Left femoral neck T-score -1.8 and right femoral neck -2.0.       • 2/28/19 - [facility] his [facility] hospitalization. Hemoglobin 11.1, MCV 82.5, RDW 20.8. Patient continued on Ferrex one pill daily while scheduling colonoscopy. Vitamin B12 pills discontinued. Ferritin 85 (). Hemoglobin electrophoresis with normal pattern.    • 3/21/2019 colonoscopy at [facility] with minimal left-sided diverticulosis and internal hemorrhoids.        • 4/1/19 - Discussed process of anemia and options of checking bone marrow biopsy. Patient wants to proceed. IgA 263 (), IgG 621 (600-1500), IgM 120 (). Serum immunofixation with no monoclonal gammopathy identified and urine immunofixation with no monoclonal gammopathy identified.      • 4/18/19 - Bone marrow aspiration and biopsy with normocellular bone marrow with maturing trilineage hematopoiesis. There were inadequate spicules for assessment of storage iron. Flow cytometry had no phenotypic evidence of excess blasts, myeloid dysmaturation of non-Hodgkin's lymphoma. Immunohistochemical analysis revealed no increase in blasts. Cytogenetics revealed 46 XY normal male karyotype.   • 4/25/19 - Left femoral-popliteal bypass performed by Dr. Mac for LLE occlusion.  • 4/28/19 - Echocardiogram EF 58%. Chest x-ray probable mild left basilar atelectasis.   • 5/2/19 - Patient claims to be  feeling lightheaded after having had vascular surgery on the left lower extremity the previous week where he did require a unit of packed red blood cells and the hemoglobin was still low at time of discharge from [facility]. Ferritin 67 (N).   • 6/28/19 - Reviewed labs from Dr. Finnegan - WBC 7.9, hemoglobin 12.6, MCV 91.9, platelets 256.  CMP remarkable for glucose 130 (H).  ESR 12.  CRP 0.02 (N).   • 7/22/19 - Stool cards negative x3.    • 9/11/2019 hemoglobin 12.4 patient asked to decrease Ferrex to 1 pill a day from his current dose of 2 daily.    Past Medical History:   Diagnosis Date   • Arthritis        Past Surgical History:   Procedure Laterality Date   • CARPAL TUNNEL RELEASE Left 03/01/2017   • CHOLECYSTECTOMY     • CORONARY ANGIOPLASTY WITH STENT PLACEMENT     • OTHER SURGICAL HISTORY      heart bypass         Current Outpatient Medications:   •  amLODIPine (NORVASC) 5 MG tablet, Take 5 mg by mouth Daily., Disp: , Rfl: 3  •  ANORO ELLIPTA 62.5-25 MCG/INH aerosol powder  inhaler, , Disp: , Rfl:   •  ARNUITY ELLIPTA 100 MCG/ACT aerosol powder , USE 1 PUFF DAILY **REPLACES QVAR DUE TO INSURANCE INTERFERENCE**, Disp: , Rfl: 3  •  ascorbic acid (VITAMIN C) 100 MG tablet, VITAMIN C TABS, Disp: , Rfl:   •  aspirin 81 MG tablet, Take 81 mg by mouth Daily., Disp: , Rfl:   •  B Complex-Biotin-FA (B COMPLETE) tablet, B COMPLETE TABS, Disp: , Rfl:   •  beclomethasone (QVAR) 80 MCG/ACT inhaler, Inhale 1 puff., Disp: , Rfl:   •  carvedilol (COREG) 12.5 MG tablet, Take 12.5 mg by mouth., Disp: , Rfl:   •  clopidogrel (PLAVIX) 75 MG tablet, Take 75 mg by mouth Daily., Disp: , Rfl: 3  •  [employer] B-12 500 MCG tablet, Take 1,000 mcg by mouth Daily., Disp: , Rfl: 3  •  FERREX 150 150 MG capsule, Take 1 capsule by mouth 2 (Two) Times a Day., Disp: 60 capsule, Rfl: 6  •  folic acid (FOLVITE) 1 MG tablet, Take 1,000 mcg by mouth Daily., Disp: , Rfl: 3  •  isosorbide mononitrate (IMDUR) 60 MG 24 hr tablet, Take 60 mg by mouth Daily.,  Disp: , Rfl: 3  •  losartan (COZAAR) 100 MG tablet, Take 100 mg by mouth Daily., Disp: , Rfl: 3  •  meloxicam (MOBIC) 15 MG tablet, Take 1 tablet by mouth Daily., Disp: 90 tablet, Rfl: 1  •  methotrexate 2.5 MG tablet, Take 4 tablets by mouth 1 (One) Time Per Week., Disp: 52 tablet, Rfl: 0  •  montelukast (SINGULAIR) 10 MG tablet, Take 10 mg by mouth Daily., Disp: , Rfl: 3  •  ranolazine (RANEXA) 1000 MG 12 hr tablet, Take 1,000 mg by mouth., Disp: , Rfl:   •  rosuvastatin (CRESTOR) 10 MG tablet, Take 10 mg by mouth Daily., Disp: , Rfl: 3    No Known Allergies    Family History   Family history unknown: Yes       Family history is unknown by patient.    Social History     Tobacco Use   • Smoking status: Never Smoker   • Smokeless tobacco: Never Used   Substance Use Topics   • Alcohol use: No     Frequency: Never   • Drug use: No       I have reviewed the history of present illness, past medical history, family history, social history, lab results, all notes and other records since the patient was last seen on 5/2/19.    SUBJECTIVE: Patient is here for follow of anemia of chronic disease and RA.    Female accompanied patient today.   STEVEN Glil present during office visit.       ROS:  Review of Systems   Constitutional: Negative for chills, diaphoresis, fatigue, fever and unexpected weight change.   HENT: Negative for congestion, ear pain, mouth sores, nosebleeds and sore throat.    Eyes: Negative for photophobia and visual disturbance.   Respiratory: Negative for shortness of breath, wheezing and stridor.    Cardiovascular: Negative for chest pain, palpitations and leg swelling.   Gastrointestinal: Negative for abdominal pain, blood in stool, constipation, diarrhea, nausea and vomiting.   Endocrine: Negative for cold intolerance and heat intolerance.   Genitourinary: Negative for dysuria and hematuria.   Musculoskeletal: Negative for arthralgias, joint swelling and neck stiffness.   Skin: Negative for color  "change, rash and wound.   Neurological: Negative for seizures, syncope, numbness and headaches.   Hematological: Negative for adenopathy. Does not bruise/bleed easily.        No obvious bleeding   Psychiatric/Behavioral: Negative for agitation, confusion and hallucinations. The patient is not nervous/anxious.        Objective:    Vitals:    09/11/19 1308   BP: 112/67   Pulse: 54   Resp: 18   Temp: 97.8 °F (36.6 °C)   Weight: 89 kg (196 lb 3.2 oz)   Height: 180.3 cm (71\")   PainSc: 0-No pain       ECOG  (1) Restricted in physically strenuous activity, ambulatory and able to do work of light nature    Physical Exam   Constitutional: He is oriented to person, place, and time. No distress.       HENT:   Head: Normocephalic and atraumatic.   Partial dentures.    Eyes: Conjunctivae and EOM are normal. Right eye exhibits no discharge. Left eye exhibits no discharge. No scleral icterus.   Neck: Normal range of motion. Neck supple. No thyromegaly present.   Cardiovascular: Normal rate, regular rhythm and normal heart sounds. Exam reveals no gallop and no friction rub.   No murmur heard.  Pulmonary/Chest: Effort normal. No stridor. No respiratory distress. He has no wheezes.   Abdominal: Soft. Bowel sounds are normal. He exhibits no distension and no mass. There is no tenderness. There is no rebound and no guarding.   Umbilical hernia is present.    Musculoskeletal: Normal range of motion. He exhibits no edema or tenderness.   Venous stripping scar on left lower extremity. Trace edema right lower extremity and 1+ edema left lower extremity.      Lymphadenopathy:     He has no cervical adenopathy.     He has no axillary adenopathy.        Right: No supraclavicular adenopathy present.        Left: No supraclavicular adenopathy present.   Neurological: He is alert and oriented to person, place, and time. He exhibits normal muscle tone.   Skin: Skin is warm. No bruising, no petechiae and no rash noted. He is not diaphoretic. No " erythema.   8 cm scar on left medial lower leg. Large scar left medial thigh. Scar on right medial thigh.       Psychiatric: He has a normal mood and affect. His behavior is normal.   Nursing note and vitals reviewed.      RECENT LABS  WBC   Date Value Ref Range Status   09/11/2019 6.38 3.40 - 10.80 10*3/mm3 Final   04/29/2019 8.35 4.5 - 11.0 10*3/uL Final     RBC   Date Value Ref Range Status   09/11/2019 4.10 (L) 4.14 - 5.80 10*6/mm3 Final   04/29/2019 2.46 (L) 4.5 - 5.9 10*6/uL Final   04/28/2019 2.65 (L) 4.5 - 5.9 10*6/uL Final     Hemoglobin   Date Value Ref Range Status   09/11/2019 12.4 (L) 13.0 - 17.7 g/dL Final   04/29/2019 7.3 (L) 13.5 - 17.5 g/dL Final     Hematocrit   Date Value Ref Range Status   09/11/2019 36.9 (L) 37.5 - 51.0 % Final   04/29/2019 22.6 (L) 41.0 - 53.0 % Final     MCV   Date Value Ref Range Status   09/11/2019 90.0 79.0 - 97.0 fL Final   04/29/2019 91.9 80.0 - 100.0 fL Final     MCH   Date Value Ref Range Status   09/11/2019 30.2 26.6 - 33.0 pg Final   04/29/2019 29.7 26.0 - 34.0 pg Final     MCHC   Date Value Ref Range Status   09/11/2019 33.6 31.5 - 35.7 g/dL Final   04/29/2019 32.3 31.0 - 37.0 g/dL Final     RDW   Date Value Ref Range Status   09/11/2019 18.1 (H) 12.3 - 15.4 % Final   04/29/2019 19.7 (H) 12.0 - 16.8 % Final     RDW-SD   Date Value Ref Range Status   09/11/2019 57.2 (H) 37.0 - 54.0 fl Final     MPV   Date Value Ref Range Status   09/11/2019 8.6 6.0 - 12.0 fL Final   04/29/2019 9.4 6.7 - 10.8 fL Final     Platelets   Date Value Ref Range Status   09/11/2019 333 140 - 450 10*3/mm3 Final   04/29/2019 182 140 - 440 10*3/uL Final     Neutrophil Rel %   Date Value Ref Range Status   04/29/2019 62.0 45 - 80 % Final     Neutrophil %   Date Value Ref Range Status   09/11/2019 56.1 42.7 - 76.0 % Final     Lymphocyte Rel %   Date Value Ref Range Status   04/29/2019 26.5 15 - 50 % Final     Lymphocyte %   Date Value Ref Range Status   09/11/2019 30.6 19.6 - 45.3 % Final      Monocyte Rel %   Date Value Ref Range Status   04/29/2019 7.8 0 - 15 % Final     Monocyte %   Date Value Ref Range Status   09/11/2019 6.7 5.0 - 12.0 % Final     Eosinophil %   Date Value Ref Range Status   09/11/2019 5.5 0.3 - 6.2 % Final   04/29/2019 2.8 0 - 7 % Final   04/27/2019 1.0 0 - 7 % Final     Basophil Rel %   Date Value Ref Range Status   04/29/2019 0.5 0 - 2 % Final     Basophil %   Date Value Ref Range Status   09/11/2019 1.1 0.0 - 1.5 % Final     Immature Grans %   Date Value Ref Range Status   04/29/2019 0.4 (H) 0 % Final     Neutrophils Absolute   Date Value Ref Range Status   04/29/2019 5.19 2.0 - 8.8 10*3/uL Final     Neutrophils, Absolute   Date Value Ref Range Status   09/11/2019 3.58 1.70 - 7.00 10*3/mm3 Final     Lymphocytes Absolute   Date Value Ref Range Status   04/29/2019 2.21 0.7 - 5.5 10*3/uL Final     Lymphocytes, Absolute   Date Value Ref Range Status   09/11/2019 1.95 0.70 - 3.10 10*3/mm3 Final     Monocytes Absolute   Date Value Ref Range Status   04/29/2019 0.65 0.0 - 1.7 10*3/uL Final     Monocytes, Absolute   Date Value Ref Range Status   09/11/2019 0.43 0.10 - 0.90 10*3/mm3 Final     Eosinophils Absolute   Date Value Ref Range Status   04/29/2019 0.23 0.0 - 0.8 10*3/uL Final     Eosinophils, Absolute   Date Value Ref Range Status   09/11/2019 0.35 0.00 - 0.40 10*3/mm3 Final     Basophils Absolute   Date Value Ref Range Status   04/29/2019 0.04 0.0 - 0.2 10*3/uL Final     Basophils, Absolute   Date Value Ref Range Status   09/11/2019 0.07 0.00 - 0.20 10*3/mm3 Final     Immature Grans, Absolute   Date Value Ref Range Status   04/29/2019 0.03 <1 10*3/uL Final     nRBC   Date Value Ref Range Status   04/29/2019 0 0 /100(WBC) Final       Lab Results   Component Value Date    GLUCOSE 130 (H) 06/28/2019    BUN 10 06/28/2019    CREATININE 1.10 06/28/2019    EGFRIFNONA 67 06/28/2019    BCR 9.1 06/28/2019    K 3.6 06/28/2019    CO2 23.0 06/28/2019    CALCIUM 9.3 06/28/2019    ALBUMIN  3.60 06/28/2019    LABIL2 1.2 04/26/2019    AST 18 06/28/2019    ALT 25 06/28/2019     Assessment/Plan     Anemia of chronic disease  - CBC & Differential  - CBC Auto Differential  - Ferritin    Rheumatoid arthritis involving multiple sites with positive rheumatoid factor (CMS/[facility])      ASSESSMENT:  The patient claims to be feeling all right except for swelling in his legs.  He does have a history of vascular disease and I have told him that he could wear elastic stockings to help reduce the swelling.  His hemoglobin is stable and he is taking 2 Ferrex pills daily.  I have asked him to decrease it to 1 pill a day.  He continues on 10 mg of methotrexate weekly and is taking daily folic acid.  Dr. Finnegan has retired and he is supposed to be seeing a new rheumatologist, Dr. Yosef solano.    PLAN:   Decrease Ferrex po to once a day.   Check ferritin level next visit.     I have reviewed lab results, imaging, vitals, and medications with the patient today. Will follow up in in three months with NPX2.    Patient verbalized understanding and is in agreement of the above plan.    I have reviewed and agree with the above information.   George Gilmore M.D., F.A.C.P.      Much of the above report is an electronic transcription//translation of the spoken language to printed text using Dragon Software. As such, the subtleties and finesse of the spoken language may permit erroneous, or at times, nonsensical words or phrases to be inadvertently transcribed; thus changes may be made at a later date to rectify these errors.

## 2019-09-13 ENCOUNTER — APPOINTMENT (OUTPATIENT)
Dept: LAB | Facility: HOSPITAL | Age: 69
End: 2019-09-13

## 2019-09-13 ENCOUNTER — APPOINTMENT (OUTPATIENT)
Dept: ONCOLOGY | Facility: CLINIC | Age: 69
End: 2019-09-13

## 2019-12-11 ENCOUNTER — APPOINTMENT (OUTPATIENT)
Dept: LAB | Facility: HOSPITAL | Age: 69
End: 2019-12-11

## 2019-12-11 ENCOUNTER — APPOINTMENT (OUTPATIENT)
Dept: ONCOLOGY | Facility: CLINIC | Age: 69
End: 2019-12-11

## 2019-12-12 ENCOUNTER — OFFICE VISIT (OUTPATIENT)
Dept: ONCOLOGY | Facility: CLINIC | Age: 69
End: 2019-12-12

## 2019-12-12 VITALS
WEIGHT: 193 LBS | BODY MASS INDEX: 27.02 KG/M2 | SYSTOLIC BLOOD PRESSURE: 120 MMHG | HEART RATE: 57 BPM | DIASTOLIC BLOOD PRESSURE: 62 MMHG | HEIGHT: 71 IN

## 2019-12-12 DIAGNOSIS — D63.8 ANEMIA OF CHRONIC DISEASE: Primary | ICD-10-CM

## 2019-12-12 LAB
ALBUMIN SERPL-MCNC: 4 G/DL (ref 3.5–5.2)
ALBUMIN/GLOB SERPL: 2 G/DL
ALP SERPL-CCNC: 16 U/L (ref 39–117)
ALT SERPL W P-5'-P-CCNC: 39 U/L (ref 1–41)
ANION GAP SERPL CALCULATED.3IONS-SCNC: 13 MMOL/L (ref 5–15)
AST SERPL-CCNC: 29 U/L (ref 1–40)
BASOPHILS # BLD AUTO: 0.1 10*3/MM3 (ref 0–0.2)
BASOPHILS NFR BLD AUTO: 1.1 % (ref 0–1.5)
BILIRUB SERPL-MCNC: 1 MG/DL (ref 0.2–1.2)
BUN BLD-MCNC: 7 MG/DL (ref 8–23)
BUN/CREAT SERPL: 5.5 (ref 7–25)
CALCIUM SPEC-SCNC: 9.6 MG/DL (ref 8.6–10.5)
CHLORIDE SERPL-SCNC: 100 MMOL/L (ref 98–107)
CO2 SERPL-SCNC: 27 MMOL/L (ref 22–29)
CREAT BLD-MCNC: 1.27 MG/DL (ref 0.76–1.27)
DEPRECATED RDW RBC AUTO: 55.1 FL (ref 37–54)
EOSINOPHIL # BLD AUTO: 0.2 10*3/MM3 (ref 0–0.4)
EOSINOPHIL NFR BLD AUTO: 4.1 % (ref 0.3–6.2)
ERYTHROCYTE [DISTWIDTH] IN BLOOD BY AUTOMATED COUNT: 17.1 % (ref 12.3–15.4)
FERRITIN SERPL-MCNC: 132.1 NG/ML (ref 30–400)
GFR SERPL CREATININE-BSD FRML MDRD: 56 ML/MIN/1.73
GLOBULIN UR ELPH-MCNC: 2 GM/DL
GLUCOSE BLD-MCNC: 125 MG/DL (ref 65–99)
HCT VFR BLD AUTO: 33.6 % (ref 37.5–51)
HGB BLD-MCNC: 11.5 G/DL (ref 13–17.7)
LYMPHOCYTES # BLD AUTO: 1.4 10*3/MM3 (ref 0.7–3.1)
LYMPHOCYTES NFR BLD AUTO: 24.2 % (ref 19.6–45.3)
MCH RBC QN AUTO: 31.5 PG (ref 26.6–33)
MCHC RBC AUTO-ENTMCNC: 34.2 G/DL (ref 31.5–35.7)
MCV RBC AUTO: 92.2 FL (ref 79–97)
MONOCYTES # BLD AUTO: 0.3 10*3/MM3 (ref 0.1–0.9)
MONOCYTES NFR BLD AUTO: 4.9 % (ref 5–12)
NEUTROPHILS # BLD AUTO: 3.8 10*3/MM3 (ref 1.7–7)
NEUTROPHILS NFR BLD AUTO: 65.7 % (ref 42.7–76)
NRBC BLD AUTO-RTO: 0.4 /100 WBC (ref 0–0.2)
PLATELET # BLD AUTO: 274 10*3/MM3 (ref 140–450)
PMV BLD AUTO: 7.1 FL (ref 6–12)
POTASSIUM BLD-SCNC: 4.3 MMOL/L (ref 3.5–5.2)
PROT SERPL-MCNC: 6 G/DL (ref 6–8.5)
RBC # BLD AUTO: 3.65 10*6/MM3 (ref 4.14–5.8)
SODIUM BLD-SCNC: 140 MMOL/L (ref 136–145)
WBC NRBC COR # BLD: 5.7 10*3/MM3 (ref 3.4–10.8)

## 2019-12-12 PROCEDURE — 82728 ASSAY OF FERRITIN: CPT | Performed by: INTERNAL MEDICINE

## 2019-12-12 PROCEDURE — 85025 COMPLETE CBC W/AUTO DIFF WBC: CPT | Performed by: NURSE PRACTITIONER

## 2019-12-12 PROCEDURE — 36415 COLL VENOUS BLD VENIPUNCTURE: CPT | Performed by: INTERNAL MEDICINE

## 2019-12-12 PROCEDURE — 99214 OFFICE O/P EST MOD 30 MIN: CPT | Performed by: INTERNAL MEDICINE

## 2019-12-12 PROCEDURE — 80053 COMPREHEN METABOLIC PANEL: CPT | Performed by: INTERNAL MEDICINE

## 2019-12-12 RX ORDER — OMEPRAZOLE 20 MG/1
20 CAPSULE, DELAYED RELEASE ORAL DAILY
Refills: 3 | COMMUNITY
Start: 2019-09-17

## 2019-12-12 NOTE — PROGRESS NOTES
Hematology/Oncology Outpatient Follow Up    Eliceo Jacques  1950    Primary Care Physician: Hung Hwang MD  Referring Physician: No ref. provider found  Chief Complaint:   anemia of chronic disease and RA  History of Present Illness:     Anemia diagnosed August 2018-ACD secondary to rheumatoid arthritis.   · 1/23/19 - This patient was hospitalized at [facility] between 1/23/19 and 1/26/19 after transfer from [facility] where he presented with chest pain and shortness of air that had been present for several months and had worsened the day prior to admission. He had injections for vocal cord paralysis on 1/22/19 and reported chronic cough that became productive post injections. He was not having any fevers or chills. At [facility] hemoglobin was 9.9 and on admission to [facility] WBC 17.2, hemoglobin 7.9, MCV 78, platelet count 278,000. He underwent left heart catheterization with medical management planned. Review of his prior records revealed anemia dating back to August 2018, worsening over time. He reported blood loss after right arterial bypass in October 2018 when iron studies had revealed low iron of 23, TIBC normal at 304, iron saturation low at 7 and ferritin normal at 67.4. Stool Hemoccult was negative. He was started on Methotrexate by Dr. Finnegan for rheumatoid arthritis five weeks prior to admission. He reported a 30 pounds weight loss within a year and reported history of hiatal hernia with GERD. He denied rectal bleeding, black stool or melena. He had colonoscopy a little over five years ago by Dr. Alberts revealing polyps and was due for a repeat in fall 2018 but did not schedule due to other illnesses. His last EGD was a long time ago. Hematology consultation was obtained and workup revealed UA negative for blood, RDW 24.5, creatinine 0.9 (0.7-1.2), transaminases normal, iron 17 (), TIBC 285 (261-452), iron sat 6 (20-55), ferritin 82.2 (17.9-464), folate 10.4 (2.8-20), Vitamin B12 of 294 (239-931), haptoglobin  496 (), copper 1101 (510-1610), MMA 0.15 (0-0.4), stool Hemoccult negative, SPEP with slight hypogammaglobulinemia with no monoclonal protein identified. Chest x-ray had no acute cardiopulmonary abnormality. He was transfused 1 unit of packed red blood cells for a hemoglobin of 7.9. Retic count was normal. Patient was started on oral Vitamin B12 replacement and weekly Methotrexate was held.  1/7/2019 DEXA scan showed osteopenia of femoral neck. Left femoral neck T-score -1.8 and right femoral neck -2.0.       · 2/28/19 - [facility] [place] [facility] [place]. Hemoglobin 11.1, MCV 82.5, RDW 20.8. Patient continued on Ferrex one pill daily while scheduling colonoscopy. Vitamin B12 pills discontinued. Ferritin 85 (). Hemoglobin electrophoresis with normal pattern.    · 3/21/2019 colonoscopy at [facility] with minimal left-sided diverticulosis and internal hemorrhoids.        · 4/1/19 - Discussed process of anemia and options of checking bone marrow biopsy. Patient wants to proceed. IgA 263 (), IgG 621 (600-1500), IgM 120 (). Serum immunofixation with no monoclonal gammopathy identified and urine immunofixation with no monoclonal gammopathy identified.      · 4/18/19 - Bone marrow aspiration and biopsy with normocellular bone marrow with maturing trilineage hematopoiesis. There were inadequate spicules for assessment of storage iron. Flow cytometry had no phenotypic evidence of excess blasts, myeloid dysmaturation of non-Hodgkin's lymphoma. Immunohistochemical analysis revealed no increase in blasts. Cytogenetics revealed 46 XY normal male karyotype.   · 4/25/19 - Left femoral-popliteal bypass performed by Dr. Mac for LLE occlusion.  · 4/28/19 - Echocardiogram EF 58%. Chest x-ray probable mild left basilar atelectasis.   · 5/2/19 - Patient claims to be feeling lightheaded after having had vascular surgery on the left lower extremity the previous  week where he did require a unit of packed red blood cells and the hemoglobin was still low at time of discharge from [facility]. Ferritin 67 (N).   · 6/28/19 - Reviewed labs from Dr. Finnegan - WBC 7.9, hemoglobin 12.6, MCV 91.9, platelets 256.  CMP remarkable for glucose 130 (H).  ESR 12.  CRP 0.02 (N).   · 7/22/19 - Stool cards negative x3.    · 9/11/2019 hemoglobin 12.4 patient asked to decrease Ferrex to 1 pill a day from his current dose of 2 daily.    Past Medical History:   Diagnosis Date   • Arthritis        Past Surgical History:   Procedure Laterality Date   • CARPAL TUNNEL RELEASE Left 03/01/2017   • CHOLECYSTECTOMY     • CORONARY ANGIOPLASTY WITH STENT PLACEMENT     • OTHER SURGICAL HISTORY      heart bypass         Current Outpatient Medications:   •  amLODIPine (NORVASC) 5 MG tablet, Take 5 mg by mouth Daily., Disp: , Rfl: 3  •  ANORO ELLIPTA 62.5-25 MCG/INH aerosol powder  inhaler, , Disp: , Rfl:   •  ARNUITY ELLIPTA 100 MCG/ACT aerosol powder , USE 1 PUFF DAILY **REPLACES QVAR DUE TO INSURANCE INTERFERENCE**, Disp: , Rfl: 3  •  ascorbic acid (VITAMIN C) 100 MG tablet, VITAMIN C TABS, Disp: , Rfl:   •  aspirin 81 MG tablet, Take 81 mg by mouth Daily., Disp: , Rfl:   •  B Complex-Biotin-FA (B COMPLETE) tablet, B COMPLETE TABS, Disp: , Rfl:   •  carvedilol (COREG) 12.5 MG tablet, Take 12.5 mg by mouth., Disp: , Rfl:   •  clopidogrel (PLAVIX) 75 MG tablet, Take 75 mg by mouth Daily., Disp: , Rfl: 3  •  FERREX 150 150 MG capsule, Take 1 capsule by mouth 2 (Two) Times a Day. (Patient taking differently: Take 150 mg by mouth Daily.), Disp: 60 capsule, Rfl: 6  •  folic acid (FOLVITE) 1 MG tablet, Take 1,000 mcg by mouth Daily., Disp: , Rfl: 3  •  isosorbide mononitrate (IMDUR) 60 MG 24 hr tablet, Take 60 mg by mouth Daily., Disp: , Rfl: 3  •  losartan (COZAAR) 100 MG tablet, Take 100 mg by mouth Daily., Disp: , Rfl: 3  •  methotrexate 2.5 MG tablet, Take 4 tablets by mouth 1 (One) Time Per Week., Disp: 52 tablet,  Rfl: 0  •  montelukast (SINGULAIR) 10 MG tablet, Take 10 mg by mouth Daily., Disp: , Rfl: 3  •  omeprazole (priLOSEC) 20 MG capsule, Take 20 mg by mouth Daily., Disp: , Rfl: 3  •  ranolazine (RANEXA) 1000 MG 12 hr tablet, Take 1,000 mg by mouth., Disp: , Rfl:   •  rosuvastatin (CRESTOR) 10 MG tablet, Take 10 mg by mouth Daily., Disp: , Rfl: 3  •  beclomethasone (QVAR) 80 MCG/ACT inhaler, Inhale 1 puff., Disp: , Rfl:   •  [employer] B-12 500 MCG tablet, Take 1,000 mcg by mouth Daily., Disp: , Rfl: 3  •  meloxicam (MOBIC) 15 MG tablet, Take 1 tablet by mouth Daily., Disp: 90 tablet, Rfl: 1    No Known Allergies    Family History   Family history unknown: Yes       Family history is unknown by patient.    Social History     Tobacco Use   • Smoking status: Former Smoker     Last attempt to quit:      Years since quittin.9   • Smokeless tobacco: Former User     Types: Chew     Quit date:    Substance Use Topics   • Alcohol use: No     Frequency: Never   • Drug use: No       I have reviewed the history of present illness, past medical history, family history, social history, lab results, all notes and other records since the patient was last seen at the cancer care center   sUBJECTIVE:      Patient is my office for follow-up of his chronic anemia.  He is taking 1 iron tablet a day not tolerating it well.  He takes 6 tablets of methotrexate a week.  Arthritis symptoms are controlled      ROS:      Review of Systems   Constitutional: Negative for fever.   HENT: Negative for nosebleeds and trouble swallowing.    Eyes: Negative for visual disturbance.   Respiratory: Negative for cough, shortness of breath and wheezing.    Cardiovascular: Negative for chest pain.   Gastrointestinal: Negative for abdominal pain and blood in stool.   Endocrine: Negative for cold intolerance.   Genitourinary: Negative for dysuria and hematuria.   Musculoskeletal: Negative for joint swelling.   Skin: Negative for rash.  "  Allergic/Immunologic: Negative for environmental allergies.   Neurological: Negative for seizures.   Hematological: Does not bruise/bleed easily.   Psychiatric/Behavioral: The patient is not nervous/anxious.          Objective:       Vitals:    12/12/19 1218   BP: 120/62   Pulse: 57   Weight: 87.5 kg (193 lb)   Height: 180.3 cm (71\")         PHYSICAL EXAM:      Physical Exam   Constitutional: He is oriented to person, place, and time. No distress.   HENT:   Head: Normocephalic and atraumatic.   Eyes: Conjunctivae and EOM are normal. Right eye exhibits no discharge. Left eye exhibits no discharge. No scleral icterus.   Neck: Normal range of motion. Neck supple. No thyromegaly present.   Cardiovascular: Normal rate, regular rhythm and normal heart sounds. Exam reveals no gallop and no friction rub.   Pulmonary/Chest: Effort normal. No stridor. No respiratory distress. He has no wheezes.   Abdominal: Soft. Bowel sounds are normal. He exhibits no mass. There is no tenderness. There is no rebound and no guarding.   Musculoskeletal: Normal range of motion. He exhibits no tenderness.   Lymphadenopathy:     He has no cervical adenopathy.   Neurological: He is alert and oriented to person, place, and time. He exhibits normal muscle tone.   Skin: Skin is warm. No rash noted. He is not diaphoretic. No erythema.   Psychiatric: He has a normal mood and affect. His behavior is normal.   Nursing note and vitals reviewed.       RECENT LABS:     WBC   Date Value Ref Range Status   09/11/2019 6.38 3.40 - 10.80 10*3/mm3 Final   04/29/2019 8.35 4.5 - 11.0 10*3/uL Final     RBC   Date Value Ref Range Status   09/11/2019 4.10 (L) 4.14 - 5.80 10*6/mm3 Final   04/29/2019 2.46 (L) 4.5 - 5.9 10*6/uL Final     Hemoglobin   Date Value Ref Range Status   09/11/2019 12.4 (L) 13.0 - 17.7 g/dL Final   04/29/2019 7.3 (L) 13.5 - 17.5 g/dL Final     Hematocrit   Date Value Ref Range Status   09/11/2019 36.9 (L) 37.5 - 51.0 % Final   04/29/2019 22.6 " (L) 41.0 - 53.0 % Final     MCV   Date Value Ref Range Status   09/11/2019 90.0 79.0 - 97.0 fL Final   04/29/2019 91.9 80.0 - 100.0 fL Final     MCH   Date Value Ref Range Status   09/11/2019 30.2 26.6 - 33.0 pg Final   04/29/2019 29.7 26.0 - 34.0 pg Final     MCHC   Date Value Ref Range Status   09/11/2019 33.6 31.5 - 35.7 g/dL Final   04/29/2019 32.3 31.0 - 37.0 g/dL Final     RDW   Date Value Ref Range Status   09/11/2019 18.1 (H) 12.3 - 15.4 % Final   04/29/2019 19.7 (H) 12.0 - 16.8 % Final     RDW-SD   Date Value Ref Range Status   09/11/2019 57.2 (H) 37.0 - 54.0 fl Final     MPV   Date Value Ref Range Status   09/11/2019 8.6 6.0 - 12.0 fL Final   04/29/2019 9.4 6.7 - 10.8 fL Final     Platelets   Date Value Ref Range Status   09/11/2019 333 140 - 450 10*3/mm3 Final   04/29/2019 182 140 - 440 10*3/uL Final     Neutrophil Rel %   Date Value Ref Range Status   04/29/2019 62.0 45 - 80 % Final     Neutrophil %   Date Value Ref Range Status   09/11/2019 56.1 42.7 - 76.0 % Final     Lymphocyte Rel %   Date Value Ref Range Status   04/29/2019 26.5 15 - 50 % Final     Lymphocyte %   Date Value Ref Range Status   09/11/2019 30.6 19.6 - 45.3 % Final     Monocyte Rel %   Date Value Ref Range Status   04/29/2019 7.8 0 - 15 % Final     Monocyte %   Date Value Ref Range Status   09/11/2019 6.7 5.0 - 12.0 % Final     Eosinophil %   Date Value Ref Range Status   09/11/2019 5.5 0.3 - 6.2 % Final   04/29/2019 2.8 0 - 7 % Final     Basophil Rel %   Date Value Ref Range Status   04/29/2019 0.5 0 - 2 % Final     Basophil %   Date Value Ref Range Status   09/11/2019 1.1 0.0 - 1.5 % Final     Immature Grans %   Date Value Ref Range Status   04/29/2019 0.4 (H) 0 % Final     Neutrophils Absolute   Date Value Ref Range Status   04/29/2019 5.19 2.0 - 8.8 10*3/uL Final     Neutrophils, Absolute   Date Value Ref Range Status   09/11/2019 3.58 1.70 - 7.00 10*3/mm3 Final     Lymphocytes Absolute   Date Value Ref Range Status   04/29/2019  2.21 0.7 - 5.5 10*3/uL Final     Lymphocytes, Absolute   Date Value Ref Range Status   09/11/2019 1.95 0.70 - 3.10 10*3/mm3 Final     Monocytes Absolute   Date Value Ref Range Status   04/29/2019 0.65 0.0 - 1.7 10*3/uL Final     Monocytes, Absolute   Date Value Ref Range Status   09/11/2019 0.43 0.10 - 0.90 10*3/mm3 Final     Eosinophils Absolute   Date Value Ref Range Status   04/29/2019 0.23 0.0 - 0.8 10*3/uL Final     Eosinophils, Absolute   Date Value Ref Range Status   09/11/2019 0.35 0.00 - 0.40 10*3/mm3 Final     Basophils Absolute   Date Value Ref Range Status   04/29/2019 0.04 0.0 - 0.2 10*3/uL Final     Basophils, Absolute   Date Value Ref Range Status   09/11/2019 0.07 0.00 - 0.20 10*3/mm3 Final     Immature Grans, Absolute   Date Value Ref Range Status   04/29/2019 0.03 <1 10*3/uL Final     nRBC   Date Value Ref Range Status   04/29/2019 0 0 /100(WBC) Final       Lab Results   Component Value Date    GLUCOSE 130 (H) 06/28/2019    BUN 10 06/28/2019    CREATININE 1.10 06/28/2019    EGFRIFNONA 67 06/28/2019    BCR 9.1 06/28/2019    K 3.6 06/28/2019    CO2 23.0 06/28/2019    CALCIUM 9.3 06/28/2019    ALBUMIN 3.60 06/28/2019    LABIL2 1.2 04/26/2019    AST 18 06/28/2019    ALT 25 06/28/2019         Assessment/Plan      ASSESSMENT:     1. Macrocytic anemia  2. Anemia of chronic disease related to rheumatoid arthritis  3. Anemia secondary to methotrexate  4. Rheumatoid arthritis  5. ECOG 1    PLAN:      1. Reviewed laboratory work-up with the patient hemoglobin is 12.3 MCV is elevated secondary to methotrexate.  2. Patient can stop the oral iron tablet which is not tolerating it well.  Hemoglobin is low and adequate.  3. Continue folic acid because he is on methotrexate  4. I will see him back in my office in 4 months recheck CBC at the time    I have reviewed labs results, imaging, vitals, and medications with the patient today.    Patient verbalized understanding and is in agreement of the above plan.           This report was compiled using Dragon voice recognition software. I have made every effort to proof read this document; however, typographical errors may persist.

## 2019-12-16 DIAGNOSIS — M35.00 SJOGREN'S SYNDROME WITHOUT EXTRAGLANDULAR INVOLVEMENT (HCC): ICD-10-CM

## 2019-12-16 DIAGNOSIS — M19.90 INFLAMMATORY ARTHRITIS: ICD-10-CM

## 2019-12-16 PROBLEM — M79.643 PAIN OF HAND: Status: ACTIVE | Noted: 2018-03-19

## 2019-12-16 PROBLEM — I73.9 PERIPHERAL VASCULAR DISEASE (HCC): Status: ACTIVE | Noted: 2018-08-31

## 2019-12-16 PROBLEM — I42.9 CARDIOMYOPATHY (HCC): Status: ACTIVE | Noted: 2019-12-16

## 2019-12-16 PROBLEM — J44.9 CHRONIC OBSTRUCTIVE LUNG DISEASE (HCC): Status: ACTIVE | Noted: 2019-12-16

## 2019-12-16 PROBLEM — I10 BENIGN ESSENTIAL HYPERTENSION: Status: ACTIVE | Noted: 2019-12-16

## 2019-12-16 PROBLEM — R06.00 DYSPNEA: Status: ACTIVE | Noted: 2019-12-16

## 2019-12-16 PROBLEM — M54.50 LOW BACK PAIN: Status: ACTIVE | Noted: 2017-11-17

## 2019-12-16 PROBLEM — J20.9 ACUTE BRONCHITIS: Status: ACTIVE | Noted: 2018-03-19

## 2019-12-16 PROBLEM — I25.10 CORONARY ATHEROSCLEROSIS: Status: ACTIVE | Noted: 2019-12-16

## 2019-12-16 PROBLEM — I70.223 ATHEROSCLEROSIS OF NATIVE ARTERIES OF EXTREMITIES WITH REST PAIN, BILATERAL LEGS (HCC): Status: ACTIVE | Noted: 2017-03-10

## 2019-12-16 PROBLEM — Z79.810 PROPHYLACTIC USE OF SELECTIVE ESTROGEN RECEPTOR MODULATORS (SERMS): Status: ACTIVE | Noted: 2019-02-05

## 2019-12-16 PROBLEM — Z86.39 HISTORY OF METABOLIC DISORDER: Status: ACTIVE | Noted: 2017-03-10

## 2019-12-16 PROBLEM — I21.4 NSTEMI (NON-ST ELEVATED MYOCARDIAL INFARCTION) (HCC): Status: ACTIVE | Noted: 2019-04-28

## 2019-12-16 PROBLEM — E78.5 HYPERLIPIDEMIA: Status: ACTIVE | Noted: 2019-12-16

## 2020-01-10 DIAGNOSIS — M35.00 SJOGREN'S SYNDROME WITHOUT EXTRAGLANDULAR INVOLVEMENT (HCC): ICD-10-CM

## 2020-01-10 DIAGNOSIS — M19.90 INFLAMMATORY ARTHRITIS: ICD-10-CM

## 2020-01-15 DIAGNOSIS — M35.00 SJOGREN'S SYNDROME WITHOUT EXTRAGLANDULAR INVOLVEMENT (HCC): ICD-10-CM

## 2020-01-15 DIAGNOSIS — M19.90 INFLAMMATORY ARTHRITIS: ICD-10-CM

## 2020-02-10 RX ORDER — DULOXETIN HYDROCHLORIDE 30 MG/1
CAPSULE, DELAYED RELEASE ORAL
Qty: 90 CAPSULE | Refills: 1 | OUTPATIENT
Start: 2020-02-10

## 2020-04-06 ENCOUNTER — TELEPHONE (OUTPATIENT)
Dept: ONCOLOGY | Facility: CLINIC | Age: 70
End: 2020-04-06

## 2020-04-06 NOTE — TELEPHONE ENCOUNTER
Patient wants to cancel his appointments on 04/15/20 and 04/16/20    Patient phone number [phone]